# Patient Record
(demographics unavailable — no encounter records)

---

## 2024-10-28 NOTE — REASON FOR VISIT
[Parent] : parent [FreeTextEntry2] : This is a 17 year old M with right ankle pain after he fell on it today during a game.  Pain with WB.  No prior history. [employer]

## 2024-10-28 NOTE — ASSESSMENT
[FreeTextEntry1] : We reviewed the findings and the history. Questions were answered and concerns addressed. The options were outlined. Tall cam boot planned. Rest, NSAIDs, ice. If no improvement in 72 hours, will proceed with MRI prior to follow up with Dr. Nance.  Patient seen by Dianna POON who determined the assessment and plan and participated in all aspects of the office encounter.

## 2024-10-28 NOTE — HISTORY OF PRESENT ILLNESS
[Right Leg] : right leg [Sports related] : sports related [Sudden] : sudden [6] : 6 [5] : 5 [Constant] : constant [Walking] : walking [de-identified] : 10/28/24: pt is a 16 y/o male with rt ankle pain. pt states that he tried to tackle somebody during a football game and fell on his rt ankle. injury occurred today. hx of rt ankle sprain. pain localized to rt ankle. pt c/o of numbness/tingling. pt unable to put full weight on to his rt leg.  [] : no [FreeTextEntry1] : ankle [FreeTextEntry3] : 10/28/24 [de-identified] : motion  [de-identified] : none  [de-identified] : [facility]  [de-identified] : [employer]  [de-identified] : football

## 2024-10-28 NOTE — PHYSICAL EXAM
[Right] : right foot and ankle [Mild] : mild swelling of dorsal foot [NL (40)] : plantar flexion 40 degrees [NL 30)] : inversion 30 degrees [NL (20)] : eversion 20 degrees [2+] : posterior tibialis pulse: 2+ [] : patient ambulates without assistive device [FreeTextEntry9] : pain with inversion

## 2024-11-06 NOTE — DISCUSSION/SUMMARY
[de-identified] : I spoke with the urgent care provider, reviewed notes, and images.  The patients condition is acute.  Confounding medical conditions/concerns: None  Tests/Studies Independently Interpreted Today: X-Ray right ankle is benign, no obvious bony abnormalities.   Advised the patient that their clinical examination and report of symptoms are consistent with ankle sprain. Discussed their diagnosis and treatment options at length including the risks and benefits of both surgical and non-surgical options.  Cautiously optimistic that this will heal and resolve through proper rest and rehab. Given this has been ongoing, recommended he obtain an MRI to rule out any bony involvement.   Start physical therapy to work on ankle strengthening and proprioception  The patient will gradually transition from pneumatic CAM boot into lace-up ankle brace to ensure stability and avoid further injury - fitted and dispensed in office today.   Prescribed the patient Motrin 600mgs and discussed risks of side effects and timing and management of medication. Side effects include but are not limited to gi ulcers and irritation, as well as kidney failure and bleeding issues.   Follow up in one week to discuss clearance

## 2024-11-06 NOTE — PHYSICAL EXAM
[2+] : posterior tibialis pulse: 2+ [] : varicosities are warm and well perfused [Normal] : saphenous nerve sensation normal [Right] : right ankle [Weight -] : weightbearing [FreeTextEntry8] : Tender ATFL, tender CFL, mildly tender syndesmosis  [de-identified] : With weakness  [de-identified] : +triple hop  [de-identified] : in pneumatic CAM boot  [FreeTextEntry9] : X-Ray right ankle is benign, no obvious bony abnormalities.

## 2024-11-06 NOTE — RETURN TO WORK/SCHOOL
[FreeTextEntry1] :   Diagnosis: right ankle sprain   X: Patient may return to school with excused lateness on Nov 06, 2024   X: Patient cannot participate in gym/sports until follow up appointment in two weeks    Sincerely, Reynaldo Nance MD Co- Chief Sports Medicine [facility] Medical Director [facility]  [facility] [employer]

## 2024-11-06 NOTE — HISTORY OF PRESENT ILLNESS
[de-identified] : New consult for right ankle pain, injured while playing football on 10/28/24 for [employer] when he tackled another player causing them to fall on top of him. Went to [facility] and got X-Rays, placed into pneumatic CAM boot. Diagnosed with ankle sprain.  No prior Injury. Also plays lacrosse.

## 2024-11-20 NOTE — HISTORY OF PRESENT ILLNESS
[de-identified] : Here for follow up on the MRI results of the right ankle today. Feeling better, has 1st appt for PT this friday due to scheduling issues. Has been wearing the brace as well since previous visit.

## 2024-11-20 NOTE — RETURN TO WORK/SCHOOL
[FreeTextEntry1] :   To whom it may concern,   Diagnosis: high ankle sprain    _ Patient may return to work: _ Patient may not return to work until: _ Patient may return to school: _ Patient cannot participate in gym/sports until: x_ Patient may return to gym/sports on 11/14/24 _ Patient is on limited weight bearing: _ Patient may return to full activities: _ Patient requires early release from class/elevator pass for (or until):   SPECIAL INSTRUCTIONS:   Please excuse the patient, Imani Zimmer , as he  was seen in our office today, 11/13/24,  under the care of Dr. Nance   _ Full duty, no restrictions. _ Light duty, as follows: _ Limited duty, as follows: _ With Cast   _  With Brace   _ With Crutches   _ With Boot     Sincerely,   Reynaldo Nance MD Co- Chief Sports Medicine [facility] Medical Director [facility]  [facility] [employer]

## 2024-11-20 NOTE — PHYSICAL EXAM
[Right] : right foot and ankle [NL (20)] : dorsiflexion 20 degrees [NL (40)] : plantar flexion 40 degrees [2+] : posterior tibialis pulse: 2+ [] : Sensation present to light touch in all distributions [FreeTextEntry8] : nontender to palpitation  [de-identified] : difficulty with single hop

## 2024-11-20 NOTE — RETURN TO WORK/SCHOOL
[FreeTextEntry1] :   To whom it may concern,   Diagnosis: high ankle sprain    _ Patient may return to work: _ Patient may not return to work until: _ Patient may return to school: _ Patient cannot participate in gym/sports until: x_ Patient may return to gym/sports on 11/14/24 _ Patient is on limited weight bearing: _ Patient may return to full activities: _ Patient requires early release from class/elevator pass for (or until):   SPECIAL INSTRUCTIONS:   Please excuse the patient, Imani Zimmer , as he  was seen in our office today, 11/13/24,  under the care of Dr. Nance   _ Full duty, no restrictions. _ Light duty, as follows: _ Limited duty, as follows: _ With Cast   _  With Brace   _ With Crutches   _ With Boot     Sincerely,   Reynaldo Nance MD Co- Chief Sports Medicine [facility] Medical Director [facility]  \[facility]\"" [employer]

## 2024-11-20 NOTE — PHYSICAL EXAM
[Right] : right foot and ankle [NL (20)] : dorsiflexion 20 degrees [NL (40)] : plantar flexion 40 degrees [2+] : posterior tibialis pulse: 2+ [] : Sensation present to light touch in all distributions [FreeTextEntry8] : nontender to palpitation  [de-identified] : difficulty with single hop

## 2024-11-20 NOTE — DATA REVIEWED
[MRI] : MRI [Right] : of the right [Ankle] : ankle [Report was reviewed and noted in the chart] : The report was reviewed and noted in the chart [I independently reviewed and interpreted images and report] : I independently reviewed and interpreted images and report [I reviewed the films/CD] : I reviewed the films/CD [FreeTextEntry1] : MRI of the R ankle revealed diffuse patchy multifocal marrow edema throughout the visualized foot most severely involving the talus, calcaneus, and navicular bone suggesting mod diffuse multifocal stress reaction and mild high ankle sprain with nondisplaced avulsion at the posterior lateral tibial syndesmosis and mild low ankle sprains

## 2024-11-20 NOTE — DISCUSSION/SUMMARY
[de-identified] : MRI of the R ankle revealed diffuse patchy multifocal marrow edema throughout the visualized foot most severely involving the talus, calcaneus, and navicular bone suggesting mod diffuse multifocal stress reaction and mild high ankle sprain with nondisplaced avulsion at the posterior lateral tibial syndesmosis and mild low ankle sprains  We reviewed the mri findings and discussed treatment options, both operative and non operative for pt's sprain and stress reaction. Discussed risks of potential surgery. However, due to the risks of the surgery, we will try NSAIDs and therapy. Discussed management of medication.  Plan for continued PT. Discussed the importance of continued strengthening to help provide stability to the ankle and prevent recurrent ankle injuries   Discussed proper activity modification. Pt can return to gym and sports as pain permits in laceup  ice for inflammation    Prescribed patient Motrin 600mgs and discussed risks of side effects and timing and management of medication. Side effects include but are not limited to gi ulcers and irritation, as well as kidney failure and bleeding issues.   f/u 2-3 weeks    I, Kayla Mayorga, attest that this documentation has been prepared under the direction and in the presence of Provider Dr. Reynaldo Nance

## 2024-11-20 NOTE — DISCUSSION/SUMMARY
[de-identified] : MRI of the R ankle revealed diffuse patchy multifocal marrow edema throughout the visualized foot most severely involving the talus, calcaneus, and navicular bone suggesting mod diffuse multifocal stress reaction and mild high ankle sprain with nondisplaced avulsion at the posterior lateral tibial syndesmosis and mild low ankle sprains  We reviewed the mri findings and discussed treatment options, both operative and non operative for pt's sprain and stress reaction. Discussed risks of potential surgery. However, due to the risks of the surgery, we will try NSAIDs and therapy. Discussed management of medication.  Plan for continued PT. Discussed the importance of continued strengthening to help provide stability to the ankle and prevent recurrent ankle injuries   Discussed proper activity modification. Pt can return to gym and sports as pain permits in laceup  ice for inflammation    Prescribed patient Motrin 600mgs and discussed risks of side effects and timing and management of medication. Side effects include but are not limited to gi ulcers and irritation, as well as kidney failure and bleeding issues.   f/u 2-3 weeks    I, Kayla Mayorga, attest that this documentation has been prepared under the direction and in the presence of Provider Dr. Reynaldo Nance

## 2024-11-20 NOTE — HISTORY OF PRESENT ILLNESS
[de-identified] : Here for follow up on the MRI results of the right ankle today. Feeling better, has 1st appt for PT this friday due to scheduling issues. Has been wearing the brace as well since previous visit.

## 2025-01-17 NOTE — RETURN TO WORK/SCHOOL
[FreeTextEntry1] :    Diagnosis: right knee patellofemoral instability    X: Patient cannot participate in gym/sports until follow up appointment      Sincerely, Reynaldo Nance MD Co- Chief Sports Medicine [facility] Medical Director [facility]  [facility] [employer]

## 2025-01-17 NOTE — DISCUSSION/SUMMARY
[de-identified] : The patient's condition is acute Confounding medical conditions/concerns: N/A Tests/Studies Independently Interpreted Today: xray of the R knee revealed no evidence of fx or subluxation ------------------------------------------------------------------------------------------------------------------    Since this is the patients first dislocation episode will get further imaging and try all conservative treatment options including physical therapy, nsaids, and ice. Advised the patient the increased risk of needing surgery if the  patella continues to dislocate. Recurring dislocation will cause further cartilage damage which eventually will need surgical intervention.   Due to worsening pain and instability with mechanical symptoms, recommend the patient obtain MRI R knee to rule out patella subluxation. Follow up after MRI to possibly rule out surgical pathology and discuss future treatment options.   Plan for PT   The patient will begin use of Playmaker knee brace to ensure stability and avoid any recurrent instability/buckling events - fitted and dispensed in office today.   Prescribed patient Motrin 600mgs and discussed risks of side effects and timing and management of medication. Side effects include but are not limited to gi ulcers and irritation, as well as kidney failure and bleeding issues.   Discussed activity modification. Pt will remain out of gym and sports at this time   f/u in 2 weeks    IKayla, attest that this documentation has been prepared under the direction and in the presence of Provider Dr. Reynaldo Nance

## 2025-01-17 NOTE — HISTORY OF PRESENT ILLNESS
[de-identified] : Injury to the right knee while at school today. patient was doing something with friends in class and the patella dislocated. patient was able to reduce while at school. Went to PCP and had XR done to confirm the patella was reduced. 1st dislocation

## 2025-01-17 NOTE — RETURN TO WORK/SCHOOL
[FreeTextEntry1] :    Diagnosis: right knee patellofemoral instability    X: Patient cannot participate in gym/sports until follow up appointment      Sincerely, Reynaldo Nance MD Co- Chief Sports Medicine [facility] Medical Director [facility]  [facility] [employer]

## 2025-01-17 NOTE — DISCUSSION/SUMMARY
[de-identified] : The patient's condition is acute Confounding medical conditions/concerns: N/A Tests/Studies Independently Interpreted Today: xray of the R knee revealed no evidence of fx or subluxation ------------------------------------------------------------------------------------------------------------------    Since this is the patients first dislocation episode will get further imaging and try all conservative treatment options including physical therapy, nsaids, and ice. Advised the patient the increased risk of needing surgery if the  patella continues to dislocate. Recurring dislocation will cause further cartilage damage which eventually will need surgical intervention.   Due to worsening pain and instability with mechanical symptoms, recommend the patient obtain MRI R knee to rule out patella subluxation. Follow up after MRI to possibly rule out surgical pathology and discuss future treatment options.   Plan for PT   The patient will begin use of Playmaker knee brace to ensure stability and avoid any recurrent instability/buckling events - fitted and dispensed in office today.   Prescribed patient Motrin 600mgs and discussed risks of side effects and timing and management of medication. Side effects include but are not limited to gi ulcers and irritation, as well as kidney failure and bleeding issues.   Discussed activity modification. Pt will remain out of gym and sports at this time   f/u in 2 weeks    IKayla, attest that this documentation has been prepared under the direction and in the presence of Provider Dr. Reynaldo Nance

## 2025-01-17 NOTE — DISCUSSION/SUMMARY
[de-identified] : The patient's condition is acute Confounding medical conditions/concerns: N/A Tests/Studies Independently Interpreted Today: xray of the R knee revealed no evidence of fx or subluxation ------------------------------------------------------------------------------------------------------------------    Since this is the patients first dislocation episode will get further imaging and try all conservative treatment options including physical therapy, nsaids, and ice. Advised the patient the increased risk of needing surgery if the  patella continues to dislocate. Recurring dislocation will cause further cartilage damage which eventually will need surgical intervention.   Due to worsening pain and instability with mechanical symptoms, recommend the patient obtain MRI R knee to rule out patella subluxation. Follow up after MRI to possibly rule out surgical pathology and discuss future treatment options.   Plan for PT   The patient will begin use of Playmaker knee brace to ensure stability and avoid any recurrent instability/buckling events - fitted and dispensed in office today.   Prescribed patient Motrin 600mgs and discussed risks of side effects and timing and management of medication. Side effects include but are not limited to gi ulcers and irritation, as well as kidney failure and bleeding issues.   Discussed activity modification. Pt will remain out of gym and sports at this time   f/u in 2 weeks    IKayla, attest that this documentation has been prepared under the direction and in the presence of Provider Dr. Reynaldo Nance

## 2025-01-17 NOTE — HISTORY OF PRESENT ILLNESS
[de-identified] : Injury to the right knee while at school today. patient was doing something with friends in class and the patella dislocated. patient was able to reduce while at school. Went to PCP and had XR done to confirm the patella was reduced. 1st dislocation

## 2025-01-17 NOTE — HISTORY OF PRESENT ILLNESS
[de-identified] : Injury to the right knee while at school today. patient was doing something with friends in class and the patella dislocated. patient was able to reduce while at school. Went to PCP and had XR done to confirm the patella was reduced. 1st dislocation

## 2025-01-17 NOTE — RETURN TO WORK/SCHOOL
[FreeTextEntry1] :    Diagnosis: right knee patellofemoral instability    X: Patient cannot participate in gym/sports until follow up appointment      Sincerely, Reynaldo Nance MD Co- Chief Sports Medicine [facility] Medical Director [facility]  [facility] [employer]

## 2025-01-17 NOTE — PHYSICAL EXAM
[Right] : right knee [] : negative Lachmann [FreeTextEntry9] : xray of the R knee revealed no evidence of fx or subluxation

## 2025-02-03 NOTE — HISTORY OF PRESENT ILLNESS
[de-identified] : Patient is here to follow up on MRI results for right knee. Notes improvement. Has been resting from activity. 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 Did not begin PT.

## 2025-02-03 NOTE — HISTORY OF PRESENT ILLNESS
[de-identified] : Patient is here to follow up on MRI results for right knee. Notes improvement. Has been resting from activity. 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 Did not begin PT.

## 2025-02-03 NOTE — PHYSICAL EXAM
[Right] : right knee [NL (0)] : extension 0 degrees [4___] : quadriceps 4[unfilled]/5 [] : patella maltracking [FreeTextEntry8] : Tender MPFL, tender medial patella, tender lateral femoral condyle  [TWNoteComboBox7] : flexion 125 degrees

## 2025-02-03 NOTE — RETURN TO WORK/SCHOOL
[FreeTextEntry1] :    Diagnosis:  right knee patella dislocation    X: Patient cannot participate in gym/sports until follow up appointment in four weeks    Sincerely, Reynaldo Nance MD Co- Chief Sports Medicine [facility] Medical Director [facility]  [facility] [employer]

## 2025-02-03 NOTE — RETURN TO WORK/SCHOOL
[FreeTextEntry1] :    Diagnosis:  right knee patella dislocation    X: Patient cannot participate in gym/sports until follow up appointment in four weeks    Sincerely, Reynaldo Nance MD Co- Chief Sports Medicine  Medical Director [facility]  [facility] [employer]

## 2025-02-03 NOTE — DISCUSSION/SUMMARY
[Surgical risks reviewed] : Surgical risks reviewed [de-identified] : The patients condition is acute.  Confounding medical conditions/concerns: None  Tests/Studies Independently Interpreted Today: MRI right knee reveals evidence of recent lateral patella dislocation with associated bone contusions, partial medial patellofemoral ligament sprain, moderate effusion.   We reviewed the MRI findings and discussed treatment options, both operative vs non-operative for the patient's patellofemoral instability. Since this is the patients first dislocation episode, we will hold off on surgical intervention and proceed with conservative treatment modalities to include physical therapy, NSAIDs, ice, bracing, etc. Advised the patient that there is increased risk of requiring surgery if their patella continues to dislocate. Any recurrent dislocation will result in further, possible permanent cartilage damage eventually requiring surgical intervention.   Continue physical therapy to work on stabilization and strengthening.   Remain out of gym and sports  Prescribed the patient Motrin 600mgs and discussed risks of side effects and timing and management of medication. Side effects include but are not limited to gi ulcers and irritation, as well as kidney failure and bleeding issues.   Follow up in 4 weeks

## 2025-02-03 NOTE — DISCUSSION/SUMMARY
[Surgical risks reviewed] : Surgical risks reviewed [de-identified] : The patients condition is acute.  Confounding medical conditions/concerns: None  Tests/Studies Independently Interpreted Today: MRI right knee reveals evidence of recent lateral patella dislocation with associated bone contusions, partial medial patellofemoral ligament sprain, moderate effusion.   We reviewed the MRI findings and discussed treatment options, both operative vs non-operative for the patient's patellofemoral instability. Since this is the patients first dislocation episode, we will hold off on surgical intervention and proceed with conservative treatment modalities to include physical therapy, NSAIDs, ice, bracing, etc. Advised the patient that there is increased risk of requiring surgery if their patella continues to dislocate. Any recurrent dislocation will result in further, possible permanent cartilage damage eventually requiring surgical intervention.   Continue physical therapy to work on stabilization and strengthening.   Remain out of gym and sports  Prescribed the patient Motrin 600mgs and discussed risks of side effects and timing and management of medication. Side effects include but are not limited to gi ulcers and irritation, as well as kidney failure and bleeding issues.   Follow up in 4 weeks

## 2025-02-03 NOTE — DATA REVIEWED
[MRI] : MRI [Right] : of the right [Knee] : knee [Report was reviewed and noted in the chart] : The report was reviewed and noted in the chart [I independently reviewed and interpreted images and report] : I independently reviewed and interpreted images and report [I reviewed the films/CD] : I reviewed the films/CD [FreeTextEntry1] : MRI right knee reveals evidence of recent lateral patella dislocation with associated bone contusions, partial medial patellofemoral ligament sprain, moderate effusion